# Patient Record
Sex: FEMALE | Race: WHITE
[De-identification: names, ages, dates, MRNs, and addresses within clinical notes are randomized per-mention and may not be internally consistent; named-entity substitution may affect disease eponyms.]

---

## 2019-08-15 LAB
BILIRUB UR-MCNC: NEGATIVE MG/DL
COLOR UR: YELLOW
INR PPP: 1
KETONES UR STRIP-MCNC: NEGATIVE MG/DL
NITRITE UR QL STRIP: NEGATIVE
PROT UR QL STRIP: (no result)
PROTHROMBIN TIME: 10.6 SECONDS (ref 9.2–11.5)
RBC # UR STRIP: (no result) /UL
SP GR UR STRIP: 1.02 (ref 1–1.03)
URINE CLARITY: CLEAR
URINE GLUCOSE-RANDOM: NEGATIVE
URINE LEUKOCYTES: NEGATIVE
UROBILINOGEN UR STRIP-ACNC: 0.2 E.U./DL (ref 0.2–1)

## 2019-08-15 NOTE — EKG
[place] [address]
Phone:  [phone]                     ELECTROCARDIOGRAM REPORT      
_______________________________________________________________________________
 
Name:       KIA DUNN              Room:                      PRE IN  
M.R.#:  [id number]      Account #:      [id number]  
Admission:               Attend Phys:    Valentino Gee II
Discharge:               Date of Birth:  [birth year]  
         Report #: [id number]
    [id number]
_______________________________________________________________________________
THIS REPORT FOR:  //name//                      
 
                          [facility]
                                       
Test Date:    2019-08-15               Test Time:    09:15:28
Pat Name:     KIA DUNN          Department:   
Patient ID:   [id number]            Room:          
Gender:       F                        Technician:   
:          [id number]               Requested By: Valentino Gee
Order Number: [id number]    Reading MD:   Regulo Springer
                                 Measurements
Intervals                              Axis          
Rate:         78                       P:            44
VT:           145                      QRS:          -34
QRSD:         152                      T:            98
QT:           435                                    
QTc:          496                                    
                           Interpretive Statements
Sinus rhythm
Left bundle branch block
No previous ECG available for comparison
 
Electronically Signed On 8- 10:55:41 CDT by Regulo Springer
[email or web address]
 
 
 
 
 
 
 
 
 
 
 
 
 
 
 
 
 
 
 
  <ELECTRONICALLY SIGNED>
                                           By: Regulo Springer MD, [facility]   
  08/15/19     1055
D: 08/15/19 0915   _____________________________________
T: 08/15/19 0915   Regulo Springer MD, FACC     /EPI

## 2019-08-27 ENCOUNTER — HOSPITAL ENCOUNTER (INPATIENT)
Dept: HOSPITAL 96 - M.TBA | Age: 74
LOS: 5 days | Discharge: HOME HEALTH | DRG: 469 | End: 2019-09-01
Attending: INTERNAL MEDICINE | Admitting: INTERNAL MEDICINE
Payer: MEDICARE

## 2019-08-27 VITALS — BODY MASS INDEX: 32.2 KG/M2 | WEIGHT: 164 LBS | HEIGHT: 60 IN

## 2019-08-27 VITALS — DIASTOLIC BLOOD PRESSURE: 72 MMHG | SYSTOLIC BLOOD PRESSURE: 123 MMHG

## 2019-08-27 VITALS — DIASTOLIC BLOOD PRESSURE: 56 MMHG | SYSTOLIC BLOOD PRESSURE: 115 MMHG

## 2019-08-27 VITALS — DIASTOLIC BLOOD PRESSURE: 59 MMHG | SYSTOLIC BLOOD PRESSURE: 110 MMHG

## 2019-08-27 DIAGNOSIS — Z96.651: ICD-10-CM

## 2019-08-27 DIAGNOSIS — H40.9: ICD-10-CM

## 2019-08-27 DIAGNOSIS — N17.9: ICD-10-CM

## 2019-08-27 DIAGNOSIS — N18.3: ICD-10-CM

## 2019-08-27 DIAGNOSIS — J15.6: ICD-10-CM

## 2019-08-27 DIAGNOSIS — J96.00: ICD-10-CM

## 2019-08-27 DIAGNOSIS — Z81.8: ICD-10-CM

## 2019-08-27 DIAGNOSIS — D63.8: ICD-10-CM

## 2019-08-27 DIAGNOSIS — D50.9: ICD-10-CM

## 2019-08-27 DIAGNOSIS — Z98.51: ICD-10-CM

## 2019-08-27 DIAGNOSIS — F32.9: ICD-10-CM

## 2019-08-27 DIAGNOSIS — Z79.899: ICD-10-CM

## 2019-08-27 DIAGNOSIS — E11.40: ICD-10-CM

## 2019-08-27 DIAGNOSIS — I12.9: ICD-10-CM

## 2019-08-27 DIAGNOSIS — E11.22: ICD-10-CM

## 2019-08-27 DIAGNOSIS — M17.12: Primary | ICD-10-CM

## 2019-08-27 DIAGNOSIS — D62: ICD-10-CM

## 2019-08-27 DIAGNOSIS — Z85.820: ICD-10-CM

## 2019-08-27 DIAGNOSIS — Z88.8: ICD-10-CM

## 2019-08-27 PROCEDURE — 8E0Y0CZ ROBOTIC ASSISTED PROCEDURE OF LOWER EXTREMITY, OPEN APPROACH: ICD-10-PCS | Performed by: ORTHOPAEDIC SURGERY

## 2019-08-27 PROCEDURE — 0SRD0J9 REPLACEMENT OF LEFT KNEE JOINT WITH SYNTHETIC SUBSTITUTE, CEMENTED, OPEN APPROACH: ICD-10-PCS | Performed by: ORTHOPAEDIC SURGERY

## 2019-08-28 VITALS — DIASTOLIC BLOOD PRESSURE: 52 MMHG | SYSTOLIC BLOOD PRESSURE: 107 MMHG

## 2019-08-28 VITALS — DIASTOLIC BLOOD PRESSURE: 60 MMHG | SYSTOLIC BLOOD PRESSURE: 135 MMHG

## 2019-08-28 VITALS — SYSTOLIC BLOOD PRESSURE: 126 MMHG | DIASTOLIC BLOOD PRESSURE: 54 MMHG

## 2019-08-28 VITALS — DIASTOLIC BLOOD PRESSURE: 54 MMHG | SYSTOLIC BLOOD PRESSURE: 126 MMHG

## 2019-08-28 VITALS — DIASTOLIC BLOOD PRESSURE: 52 MMHG | SYSTOLIC BLOOD PRESSURE: 106 MMHG

## 2019-08-28 VITALS — SYSTOLIC BLOOD PRESSURE: 120 MMHG | DIASTOLIC BLOOD PRESSURE: 57 MMHG

## 2019-08-28 LAB
HCT VFR BLD CALC: 31.3 % (ref 37–47)
HGB BLD-MCNC: 10.5 GM/DL (ref 12–15)

## 2019-08-29 VITALS — DIASTOLIC BLOOD PRESSURE: 51 MMHG | SYSTOLIC BLOOD PRESSURE: 104 MMHG

## 2019-08-29 VITALS — SYSTOLIC BLOOD PRESSURE: 107 MMHG | DIASTOLIC BLOOD PRESSURE: 43 MMHG

## 2019-08-29 VITALS — SYSTOLIC BLOOD PRESSURE: 109 MMHG | DIASTOLIC BLOOD PRESSURE: 42 MMHG

## 2019-08-29 VITALS — DIASTOLIC BLOOD PRESSURE: 43 MMHG | SYSTOLIC BLOOD PRESSURE: 95 MMHG

## 2019-08-29 LAB
HCT VFR BLD CALC: 30.3 % (ref 37–47)
HGB BLD-MCNC: 9.9 GM/DL (ref 12–15)

## 2019-08-30 VITALS — DIASTOLIC BLOOD PRESSURE: 42 MMHG | SYSTOLIC BLOOD PRESSURE: 80 MMHG

## 2019-08-30 VITALS — DIASTOLIC BLOOD PRESSURE: 46 MMHG | SYSTOLIC BLOOD PRESSURE: 82 MMHG

## 2019-08-30 VITALS — DIASTOLIC BLOOD PRESSURE: 59 MMHG | SYSTOLIC BLOOD PRESSURE: 119 MMHG

## 2019-08-30 VITALS — DIASTOLIC BLOOD PRESSURE: 49 MMHG | SYSTOLIC BLOOD PRESSURE: 99 MMHG

## 2019-08-30 VITALS — SYSTOLIC BLOOD PRESSURE: 67 MMHG | DIASTOLIC BLOOD PRESSURE: 43 MMHG

## 2019-08-30 LAB
ABSOLUTE BASOPHILS: 0 THOU/UL (ref 0–0.2)
ABSOLUTE EOSINOPHILS: 0.1 THOU/UL (ref 0–0.7)
ABSOLUTE MONOCYTES: 0.7 THOU/UL (ref 0–1.2)
ANION GAP SERPL CALC-SCNC: 7 MMOL/L (ref 7–16)
BASOPHILS NFR BLD AUTO: 0.3 %
BUN SERPL-MCNC: 44 MG/DL (ref 7–18)
CALCIUM SERPL-MCNC: 8 MG/DL (ref 8.5–10.1)
CHLORIDE SERPL-SCNC: 98 MMOL/L (ref 98–107)
CO2 SERPL-SCNC: 30 MMOL/L (ref 21–32)
CREAT SERPL-MCNC: 2.2 MG/DL (ref 0.6–1.3)
EOSINOPHIL NFR BLD: 1.6 %
GLUCOSE SERPL-MCNC: 228 MG/DL (ref 70–99)
GRANULOCYTES NFR BLD MANUAL: 79.5 %
HCT VFR BLD CALC: 26.8 % (ref 37–47)
HGB BLD-MCNC: 8.7 GM/DL (ref 12–15)
IRON SERPL-MCNC: 11 UG/DL (ref 50–175)
LYMPHOCYTES # BLD: 0.6 THOU/UL (ref 0.8–5.3)
LYMPHOCYTES NFR BLD AUTO: 8.5 %
MAGNESIUM SERPL-MCNC: 2.6 MG/DL (ref 1.8–2.4)
MCH RBC QN AUTO: 29.1 PG (ref 26–34)
MCHC RBC AUTO-ENTMCNC: 32.6 G/DL (ref 28–37)
MCV RBC: 89.2 FL (ref 80–100)
MONOCYTES NFR BLD: 10.1 %
MPV: 7.7 FL. (ref 7.2–11.1)
NEUTROPHILS # BLD: 5.7 THOU/UL (ref 1.6–8.1)
NUCLEATED RBCS: 0 /100WBC
PLATELET COUNT*: 155 THOU/UL (ref 150–400)
POTASSIUM SERPL-SCNC: 4.1 MMOL/L (ref 3.5–5.1)
RBC # BLD AUTO: 3.01 MIL/UL (ref 4.2–5)
RDW-CV: 15.7 % (ref 10.5–14.5)
SAO2 % BLD FROM PO2: 6 % (ref 20–39)
SODIUM SERPL-SCNC: 135 MMOL/L (ref 136–145)
WBC # BLD AUTO: 7.2 THOU/UL (ref 4–11)

## 2019-08-31 VITALS — DIASTOLIC BLOOD PRESSURE: 45 MMHG | SYSTOLIC BLOOD PRESSURE: 98 MMHG

## 2019-08-31 VITALS — SYSTOLIC BLOOD PRESSURE: 130 MMHG | DIASTOLIC BLOOD PRESSURE: 50 MMHG

## 2019-08-31 VITALS — DIASTOLIC BLOOD PRESSURE: 50 MMHG | SYSTOLIC BLOOD PRESSURE: 130 MMHG

## 2019-08-31 VITALS — DIASTOLIC BLOOD PRESSURE: 47 MMHG | SYSTOLIC BLOOD PRESSURE: 123 MMHG

## 2019-08-31 VITALS — SYSTOLIC BLOOD PRESSURE: 130 MMHG | DIASTOLIC BLOOD PRESSURE: 62 MMHG

## 2019-08-31 LAB
ABSOLUTE BASOPHILS: 0 THOU/UL (ref 0–0.2)
ABSOLUTE EOSINOPHILS: 0.1 THOU/UL (ref 0–0.7)
ABSOLUTE MONOCYTES: 0.6 THOU/UL (ref 0–1.2)
ALBUMIN SERPL-MCNC: 1.8 G/DL (ref 3.4–5)
ANION GAP SERPL CALC-SCNC: 7 MMOL/L (ref 7–16)
BASOPHILS NFR BLD AUTO: 0.2 %
BILIRUB UR-MCNC: NEGATIVE MG/DL
BUN SERPL-MCNC: 47 MG/DL (ref 7–18)
CALCIUM SERPL-MCNC: 7.4 MG/DL (ref 8.5–10.1)
CHLORIDE SERPL-SCNC: 101 MMOL/L (ref 98–107)
CO2 SERPL-SCNC: 26 MMOL/L (ref 21–32)
COLOR UR: YELLOW
CREAT SERPL-MCNC: 2 MG/DL (ref 0.6–1.3)
EOSINOPHIL NFR BLD: 2.3 %
EST. AVERAGE GLUCOSE BLD GHB EST-MCNC: 151 MG/DL
GLUCOSE SERPL-MCNC: 158 MG/DL (ref 70–99)
GLYCOHEMOGLOBIN (HGB A1C): 6.9 % (ref 4.8–5.6)
GRANULOCYTES NFR BLD MANUAL: 75.6 %
HCT VFR BLD CALC: 27.9 % (ref 37–47)
HGB BLD-MCNC: 9.1 GM/DL (ref 12–15)
KETONES UR STRIP-MCNC: NEGATIVE MG/DL
LYMPHOCYTES # BLD: 0.5 THOU/UL (ref 0.8–5.3)
LYMPHOCYTES NFR BLD AUTO: 10.1 %
MCH RBC QN AUTO: 29.2 PG (ref 26–34)
MCHC RBC AUTO-ENTMCNC: 32.5 G/DL (ref 28–37)
MCV RBC: 89.6 FL (ref 80–100)
MONOCYTES NFR BLD: 11.8 %
MPV: 7.4 FL. (ref 7.2–11.1)
NEUTROPHILS # BLD: 3.7 THOU/UL (ref 1.6–8.1)
NUCLEATED RBCS: 0 /100WBC
PHOSPHATE SERPL-MCNC: 2.6 MG/DL (ref 2.5–4.9)
PLATELET COUNT*: 177 THOU/UL (ref 150–400)
POTASSIUM SERPL-SCNC: 4.5 MMOL/L (ref 3.5–5.1)
PROT UR QL STRIP: NEGATIVE
RBC # BLD AUTO: 3.11 MIL/UL (ref 4.2–5)
RBC # UR STRIP: (no result) /UL
RDW-CV: 15.8 % (ref 10.5–14.5)
SODIUM SERPL-SCNC: 134 MMOL/L (ref 136–145)
SP GR UR STRIP: 1.01 (ref 1–1.03)
URINE CLARITY: CLEAR
URINE GLUCOSE-RANDOM: NEGATIVE
URINE LEUKOCYTES-REFLEX: NEGATIVE
URINE NITRITE-REFLEX: NEGATIVE
UROBILINOGEN UR STRIP-ACNC: 0.2 E.U./DL (ref 0.2–1)
WBC # BLD AUTO: 4.8 THOU/UL (ref 4–11)

## 2019-09-01 VITALS — SYSTOLIC BLOOD PRESSURE: 134 MMHG | DIASTOLIC BLOOD PRESSURE: 62 MMHG

## 2019-09-01 VITALS — SYSTOLIC BLOOD PRESSURE: 126 MMHG | DIASTOLIC BLOOD PRESSURE: 54 MMHG

## 2019-09-01 VITALS — DIASTOLIC BLOOD PRESSURE: 57 MMHG | SYSTOLIC BLOOD PRESSURE: 122 MMHG

## 2019-09-01 LAB
ABSOLUTE EOSINOPHILS: 0.2 THOU/UL (ref 0–0.7)
ABSOLUTE MONOCYTES: 0.6 THOU/UL (ref 0–1.2)
ANION GAP SERPL CALC-SCNC: 7 MMOL/L (ref 7–16)
BUN SERPL-MCNC: 36 MG/DL (ref 7–18)
CALCIUM SERPL-MCNC: 8.3 MG/DL (ref 8.5–10.1)
CHLORIDE SERPL-SCNC: 110 MMOL/L (ref 98–107)
CO2 SERPL-SCNC: 27 MMOL/L (ref 21–32)
CREAT SERPL-MCNC: 1.4 MG/DL (ref 0.6–1.3)
EOSINOPHIL NFR BLD: 3 %
GLUCOSE SERPL-MCNC: 95 MG/DL (ref 70–99)
GRANULOCYTES NFR BLD MANUAL: 66 %
HCT VFR BLD CALC: 24.8 % (ref 37–47)
HGB BLD-MCNC: 8.1 GM/DL (ref 12–15)
LYMPHOCYTES # BLD: 0.9 THOU/UL (ref 0.8–5.3)
LYMPHOCYTES NFR BLD AUTO: 17 %
MCH RBC QN AUTO: 29.3 PG (ref 26–34)
MCHC RBC AUTO-ENTMCNC: 32.8 G/DL (ref 28–37)
MCV RBC: 89.4 FL (ref 80–100)
METAMYELOCYTES NFR BLD: 2 %
MONOCYTES NFR BLD: 12 %
MPV: 7.7 FL. (ref 7.2–11.1)
NEUTROPHILS # BLD: 3.7 THOU/UL (ref 1.6–8.1)
NUCLEATED RBCS: 0 /100WBC
PLATELET # BLD EST: ADEQUATE 10*3/UL
PLATELET COUNT*: 192 THOU/UL (ref 150–400)
POTASSIUM SERPL-SCNC: 4.8 MMOL/L (ref 3.5–5.1)
RBC # BLD AUTO: 2.78 MIL/UL (ref 4.2–5)
RBC MORPH BLD: NORMAL
RDW-CV: 15.8 % (ref 10.5–14.5)
SODIUM SERPL-SCNC: 144 MMOL/L (ref 136–145)
WBC # BLD AUTO: 5.4 THOU/UL (ref 4–11)

## 2019-09-03 NOTE — OP
[facility] 
[address] [place]  [address]                    OPERATIVE REPORT              
_______________________________________________________________________________
 
Name:       KIA DUNN            Room:           [address] [place]  
M.R.#:  [id number]      Account #:      [id number]  
Admission:  08/27/19     Attend Phys:    RADHA Ojeda
Discharge:  09/01/19     Date of Birth:  [birth year]  
         Report #: [id number]
                                                                     [id number]  
_______________________________________________________________________________
THIS REPORT FOR:  //name//                      
 
CC: Michelle Contreras
 
DATE OF SERVICE:  08/27/2019
 
 
PREOPERATIVE DIAGNOSIS:  Left knee osteoarthritis.
 
POSTOPERATIVE DIAGNOSIS:  Left knee osteoarthritis.
 
PROCEDURE:  Left total knee arthroplasty with Navio.
 
SURGEON:  Valentino Gee II, DO.
 
ASSISTANT:  EDY Harrell.
 
ANESTHESIA:  General endotracheal.
 
ESTIMATED BLOOD LOSS:  50 mL.
 
ANTIBIOTICS:  Ancef preoperatively.
 
DRAINS:  Medium Hemovac.
 
COMPLICATIONS:  None.
 
CONDITION:  Stable to recovery room.
 
IMPLANTS:  Listed in operative record and progress note.
 
BRIEF HISTORY:  The patient is seen in the preoperative area.  Preoperative H
and P was performed.  Site was marked, questions were answered.  Risks and
benefits were discussed with the patient in detail about surgery.  The patient
wished to proceed, assuming all risks.
 
DESCRIPTION OF PROCEDURE:  The patient was taken to the operative suite and
placed supine on the operating table and given appropriate anesthesia.  A
well-padded tourniquet was applied to the upper thigh, which was inflated to 300
mmHg after gravity exsanguination for duration of the procedure.  The operative
knee was sterilely prepped and draped.  Surgery began by midline incision.  This
was carried down to the subcutaneous tissues.  A medial parapatellar arthrotomy
was performed and carried down to bone.  The patella was then everted and excess
soft tissue removed from around the femur as well as osteophytes.  The [address]  [address]                    OPERATIVE REPORT              
_______________________________________________________________________________
 
Name:       KIA DUNN            Room:           [address]#:  [id number]      Account #:      [id number]  
Admission:  08/27/19     Attend Phys:    RADHA Ojeda
Discharge:  09/01/19     Date of Birth:  [birth year]  
         Report #: [id number]
                                                                     [id number]  
_______________________________________________________________________________
and tibial tracker alignment guides were applied.  The knee was then registered
through the [employer] software and the robotic hand-piece was activated,
establishing in the cutting sites, the robotic hand-piece was utilized on the
femur and the tibia.  The femoral cutting block was then applied, checked with
the Gift[employer] software for rotational alignment, pinned in appropriate position and
appropriate cuts were made.  A 4-in-1 cutting block was then applied, checked
for rotational alignment with the Navio software, pinned in appropriate position
and appropriate cuts were made.  The tibia was then exposed as excess meniscus
was removed.  Retractor was placed on collateral ligaments.  The tibial cutting
block was then applied, checked for rotational alignment with the GiftMe robotic
hand-piece as well as slope and appropriate cut was made.  The tibial bone was
removed.  The tibial baseplate was then applied, checked for rotational
alignment with the drop ghassan, pinned in appropriate position.  The femur was then
applied and box cut was reamed.  This was then trialed with the appropriate
spacer, which showed excellent fit and fill and excellent stability of the knee
through all range of motion.  The patella was then reamed in appropriate fashion
and sized to appropriate size.  Three peg holes were drilled.  It was then
trialed and showed excellent flexion, extension, excellent tracking of the
patella within the groove.  These trials were removed.  The tibia was punched in
appropriate fashion.  Bone ends were cleansed with Pulsavac irrigation and
cement was mixed and applied to final implants.  These were then malleted into
position and held the knee in extension and compressed to allow cement to cure. 
After it cured, excess cement was removed using [place] and osteotome.  Wound was
then copiously irrigated and the final spacer was then malleted in position. 
The tourniquet was deflated.  Hemostasis was obtained with electrocautery.  Pain
cocktail was injected.  PRP gel was sprayed throughout the internal aspects of
the knee.  Medium Hemovac drain was then applied.  Capsule was closed with #2
FiberWire and #1 Vicryl in figure-of-eight fashion.  Skin was closed with 2-0
Vicryl and running 3-0 Monocryl and the portal sites were closed with nylon. 
Dermabond and sterile dressing applied.  Ace wrap and Polar Care applied.  The
patient was transported to recovery room in stable condition.  Counts were
correct throughout the procedure.
 
 
 
 
 
 
 
 
 
 
 
 
<ELECTRONICALLY SIGNED>
                                        By:  Valentino Gee II, DO     
09/03/19     0839
D: 08/28/19 0827_______________________________________
T: 08/28/19 0850Valentino Gee II, DO        /nt